# Patient Record
Sex: FEMALE | Race: BLACK OR AFRICAN AMERICAN | NOT HISPANIC OR LATINO | Employment: FULL TIME | URBAN - METROPOLITAN AREA
[De-identification: names, ages, dates, MRNs, and addresses within clinical notes are randomized per-mention and may not be internally consistent; named-entity substitution may affect disease eponyms.]

---

## 2024-02-06 ENCOUNTER — TELEPHONE (OUTPATIENT)
Dept: PRIMARY CARE | Facility: CLINIC | Age: 34
End: 2024-02-06
Payer: COMMERCIAL

## 2024-02-07 ENCOUNTER — APPOINTMENT (OUTPATIENT)
Dept: PRIMARY CARE | Facility: CLINIC | Age: 34
End: 2024-02-07
Payer: COMMERCIAL

## 2024-02-14 ENCOUNTER — OFFICE VISIT (OUTPATIENT)
Dept: PRIMARY CARE | Facility: CLINIC | Age: 34
End: 2024-02-14
Payer: COMMERCIAL

## 2024-02-14 VITALS
DIASTOLIC BLOOD PRESSURE: 76 MMHG | HEART RATE: 74 BPM | WEIGHT: 162 LBS | OXYGEN SATURATION: 99 % | BODY MASS INDEX: 28.7 KG/M2 | SYSTOLIC BLOOD PRESSURE: 120 MMHG | TEMPERATURE: 97.4 F

## 2024-02-14 DIAGNOSIS — E55.9 VITAMIN D DEFICIENCY: ICD-10-CM

## 2024-02-14 DIAGNOSIS — Z00.00 PHYSICAL EXAM: Primary | ICD-10-CM

## 2024-02-14 DIAGNOSIS — E06.3 HASHIMOTO'S THYROIDITIS: ICD-10-CM

## 2024-02-14 PROCEDURE — 99395 PREV VISIT EST AGE 18-39: CPT | Performed by: INTERNAL MEDICINE

## 2024-02-14 PROCEDURE — 1036F TOBACCO NON-USER: CPT | Performed by: INTERNAL MEDICINE

## 2024-02-14 RX ORDER — LEVOTHYROXINE SODIUM 25 UG/1
25 TABLET ORAL
COMMUNITY
Start: 2023-07-05 | End: 2024-04-22 | Stop reason: SDUPTHER

## 2024-02-14 RX ORDER — ALBUTEROL SULFATE 90 UG/1
2 AEROSOL, METERED RESPIRATORY (INHALATION) EVERY 4 HOURS PRN
COMMUNITY
Start: 2013-12-30

## 2024-02-14 ASSESSMENT — PATIENT HEALTH QUESTIONNAIRE - PHQ9
SUM OF ALL RESPONSES TO PHQ9 QUESTIONS 1 AND 2: 0
2. FEELING DOWN, DEPRESSED OR HOPELESS: NOT AT ALL
1. LITTLE INTEREST OR PLEASURE IN DOING THINGS: NOT AT ALL

## 2024-02-14 ASSESSMENT — PAIN SCALES - GENERAL: PAINLEVEL: 0-NO PAIN

## 2024-02-14 NOTE — PROGRESS NOTES
"Subjective   Patient ID: Gely Hyman is a 33 y.o. female who presents for Annual Exam.    HPI  1.  Physical exam.  Pap: last done 5/2021      Review of Systems  All pertinent positive symptoms are included in the history of present illness.    All other systems have been reviewed and are negative and noncontributory to this patient's current ailments.    History reviewed. No pertinent past medical history.  History reviewed. No pertinent surgical history.  Social History     Tobacco Use    Smoking status: Never    Smokeless tobacco: Never   Substance Use Topics    Alcohol use: Never    Drug use: Never     No family history on file.  No Known Allergies  Immunization History   Administered Date(s) Administered    Moderna SARS-CoV-2 Vaccination 03/04/2022     Current Outpatient Medications   Medication Instructions    albuterol 90 mcg/actuation inhaler 2 puffs, inhalation, Every 4 hours PRN    levothyroxine (SYNTHROID, LEVOXYL) 25 mcg, oral, Daily before breakfast     Objective   Visit Vitals  /76   Pulse 74   Temp 36.3 °C (97.4 °F)   Wt 73.5 kg (162 lb)   SpO2 99%   BMI 28.70 kg/m²   Smoking Status Never   BSA 1.81 m²     No visits with results within 1 Month(s) from this visit.   Latest known visit with results is:   Legacy Encounter on 03/09/2023   Component Date Value    CONVERTED FINAL DIAGNOSIS 03/09/2023                      Value:  SOFT TISSUE OF RIGHT FOOT, EXCISION:       GANGLION CYST.   VERO GUPTA M.D.  03/13/2023 14:52  [facility] - 03/10/2023      CONVERTED CLINICAL DIAGN* 03/09/2023 Ganglion cyst right foot     CONVERTED GROSS DESCRIPT* 03/09/2023                      Value:Received in formalin labeled with the patient's name and designated,  \"ganglion cyst right foot\" is a 5.5 x 1.5 x 1.5 cm intact and cystic  portion of gray-tan soft tissue with attached fat. The external surfaces  are inked black. Sectioning reveals a cystic cavity containing a clear  and tenacious material with a surrounding " thin gray wall. Representative  sections are submitted in one cassette.      CONVERTED PRE-OPERATIVE * 03/09/2023 Excision of lesion tendon sheath right foot     CONVERTED MICROSCOPIC DE* 03/09/2023 Slides examined; description omitted.      Physical Exam  CONSTITUTIONAL - well nourished, well developed, looks like stated age, in no acute distress, not ill-appearing, and not tired appearing  SKIN - normal skin color and pigmentation, normal skin turgor without rash, lesions, or nodules visualized  HEAD - no trauma, normocephalic  EYES - pupils are equal and reactive to light, extraocular muscles are intact, and normal external exam  ENT - TM's intact, no injection, no signs of infection, uvula midline, normal tongue movement and throat normal, no exudate, nasal passage without discharge and patent  NECK - supple without rigidity, no neck mass was observed, no thyromegaly or thyroid nodules  CHEST - clear to auscultation, no wheezing, no crackles and no rales, good effort  CARDIAC - regular rate and regular rhythm, no skipped beats, no murmur  ABDOMEN - no organomegaly, soft, nontender, nondistended, normal bowel sounds, no guarding/rebound/rigidity, negative McBurney sign and negative Stroud sign  EXTREMITIES - no edema, no deformities  NEUROLOGICAL - normal gait, normal balance, normal motor, no ataxia; alert, oriented and no focal signs  PSYCHIATRIC - alert, pleasant and cordial, age-appropriate  IMMUNOLOGIC - no cervical lymphadenopathy    Assessment/Plan   Problem List Items Addressed This Visit    None  Visit Diagnoses       Physical exam    -  Primary    Relevant Orders    Lipid Panel    Comprehensive Metabolic Panel    CBC    Hashimoto's thyroiditis        Relevant Orders    TSH with reflex to Free T4 if abnormal    Vitamin D deficiency        Relevant Orders    Vitamin D 25-Hydroxy,Total (for eval of Vitamin D levels)         Healthy diet and exercise encouraged. Low fat, low salt diet. Drink plenty of  fluids. Exercise at least 5 times/week for at least 45 minutes per day.

## 2024-04-09 ENCOUNTER — TELEPHONE (OUTPATIENT)
Dept: PRIMARY CARE | Facility: CLINIC | Age: 34
End: 2024-04-09

## 2024-04-09 NOTE — TELEPHONE ENCOUNTER
Pt stated she will be sending over a fax with her labs listed and wanted to know if Dr. Leon thinks she needs more labs done prior to her surgery, please call pt.

## 2024-04-22 ENCOUNTER — OFFICE VISIT (OUTPATIENT)
Dept: PRIMARY CARE | Facility: CLINIC | Age: 34
End: 2024-04-22
Payer: COMMERCIAL

## 2024-04-22 VITALS
BODY MASS INDEX: 28.51 KG/M2 | WEIGHT: 167 LBS | HEIGHT: 64 IN | HEART RATE: 81 BPM | TEMPERATURE: 97.5 F | OXYGEN SATURATION: 96 % | SYSTOLIC BLOOD PRESSURE: 120 MMHG | DIASTOLIC BLOOD PRESSURE: 78 MMHG

## 2024-04-22 DIAGNOSIS — Z01.818 PREOP GENERAL PHYSICAL EXAM: Primary | ICD-10-CM

## 2024-04-22 DIAGNOSIS — E06.3 HASHIMOTO'S THYROIDITIS: ICD-10-CM

## 2024-04-22 PROCEDURE — 1036F TOBACCO NON-USER: CPT | Performed by: INTERNAL MEDICINE

## 2024-04-22 PROCEDURE — 99395 PREV VISIT EST AGE 18-39: CPT | Performed by: INTERNAL MEDICINE

## 2024-04-22 RX ORDER — LEVOTHYROXINE SODIUM 25 UG/1
25 TABLET ORAL
Qty: 90 TABLET | Refills: 3 | Status: SHIPPED | OUTPATIENT
Start: 2024-04-22 | End: 2024-06-07 | Stop reason: SDUPTHER

## 2024-04-22 ASSESSMENT — PATIENT HEALTH QUESTIONNAIRE - PHQ9
SUM OF ALL RESPONSES TO PHQ9 QUESTIONS 1 AND 2: 0
1. LITTLE INTEREST OR PLEASURE IN DOING THINGS: NOT AT ALL
2. FEELING DOWN, DEPRESSED OR HOPELESS: NOT AT ALL

## 2024-04-22 ASSESSMENT — PAIN SCALES - GENERAL: PAINLEVEL: 0-NO PAIN

## 2024-04-22 NOTE — PROGRESS NOTES
"Subjective   Patient ID: Gely Hyman is a 33 y.o. female who presents for surgical clearance.    HPI  1.  Physical exam. Pt needs a pre-op physical for liposuction.  Pap: last done 5/2021      Review of Systems  All pertinent positive symptoms are included in the history of present illness.    All other systems have been reviewed and are negative and noncontributory to this patient's current ailments.    History reviewed. No pertinent past medical history.  History reviewed. No pertinent surgical history.  Social History     Tobacco Use    Smoking status: Never    Smokeless tobacco: Never   Substance Use Topics    Alcohol use: Never    Drug use: Never     No family history on file.  No Known Allergies  Immunization History   Administered Date(s) Administered    Moderna SARS-CoV-2 Vaccination 03/04/2022     Current Outpatient Medications   Medication Instructions    albuterol 90 mcg/actuation inhaler 2 puffs, inhalation, Every 4 hours PRN    levothyroxine (SYNTHROID, LEVOXYL) 25 mcg, oral, Daily before breakfast     Objective   Visit Vitals  /78   Pulse 81   Temp 36.4 °C (97.5 °F)   Ht 1.613 m (5' 3.5\")   Wt 75.8 kg (167 lb)   SpO2 96%   BMI 29.12 kg/m²   Smoking Status Never   BSA 1.84 m²     No visits with results within 1 Month(s) from this visit.   Latest known visit with results is:   Legacy Encounter on 03/09/2023   Component Date Value    CONVERTED FINAL DIAGNOSIS 03/09/2023                      Value:  SOFT TISSUE OF RIGHT FOOT, EXCISION:       GANGLION CYST.   VERO GUPTA M.D.  03/13/2023 14:52  AMM - 03/10/2023      CONVERTED CLINICAL DIAGN* 03/09/2023 Ganglion cyst right foot     CONVERTED GROSS DESCRIPT* 03/09/2023                      Value:Received in formalin labeled with the patient's name and designated,  \"ganglion cyst right foot\" is a 5.5 x 1.5 x 1.5 cm intact and cystic  portion of gray-tan soft tissue with attached fat. The external surfaces  are inked black. Sectioning reveals a " cystic cavity containing a clear  and tenacious material with a surrounding thin gray wall. Representative  sections are submitted in one cassette.      CONVERTED PRE-OPERATIVE * 03/09/2023 Excision of lesion tendon sheath right foot     CONVERTED MICROSCOPIC DE* 03/09/2023 Slides examined; description omitted.      Physical Exam  CONSTITUTIONAL - well nourished, well developed, looks like stated age, in no acute distress, not ill-appearing, and not tired appearing  SKIN - normal skin color and pigmentation, normal skin turgor without rash, lesions, or nodules visualized  HEAD - no trauma, normocephalic  EYES - pupils are equal and reactive to light, extraocular muscles are intact, and normal external exam  ENT - TM's intact, no injection, no signs of infection, uvula midline, normal tongue movement and throat normal, no exudate, nasal passage without discharge and patent  NECK - supple without rigidity, no neck mass was observed, no thyromegaly or thyroid nodules  CHEST - clear to auscultation, no wheezing, no crackles and no rales, good effort  CARDIAC - regular rate and regular rhythm, no skipped beats, no murmur  ABDOMEN - no organomegaly, soft, nontender, nondistended, normal bowel sounds, no guarding/rebound/rigidity, negative McBurney sign and negative Stroud sign  EXTREMITIES - no edema, no deformities  NEUROLOGICAL - normal gait, normal balance, normal motor, no ataxia; alert, oriented and no focal signs  PSYCHIATRIC - alert, pleasant and cordial, age-appropriate  IMMUNOLOGIC - no cervical lymphadenopathy    Assessment/Plan   Problem List Items Addressed This Visit       Hashimoto's thyroiditis    Relevant Medications    levothyroxine (Synthroid, Levoxyl) 25 mcg tablet     Other Visit Diagnoses       Preop general physical exam    -  Primary    Pt cleared for liposuction surgery

## 2024-04-24 ENCOUNTER — APPOINTMENT (OUTPATIENT)
Dept: PRIMARY CARE | Facility: CLINIC | Age: 34
End: 2024-04-24
Payer: COMMERCIAL

## 2024-05-16 ENCOUNTER — TELEPHONE (OUTPATIENT)
Dept: PRIMARY CARE | Facility: CLINIC | Age: 34
End: 2024-05-16
Payer: COMMERCIAL

## 2024-05-17 NOTE — TELEPHONE ENCOUNTER
AFTER HOURS CALL:     Chief complaint: right leg swelling and pain s/p outpatient surgery lipo 360 on 5/2/24      HPI:      33 y.o. female with right leg swelling s/p lipo 5/2/24 now with right leg swelling and leg pain.  She denies fevers/chills,  CP, heart palpitations, SOB, cough, hemoptysis, or difficulty walking.           Assessment & Plan :  Patient advised to go to ED to r/o DVT    Patient expressed that she will go to the ED now.       911/EMS precautions discussed and reviewed with patient.    Patient advised to call PCP's office on next business day to schedule to be seen.

## 2024-06-07 ENCOUNTER — TELEPHONE (OUTPATIENT)
Dept: PRIMARY CARE | Facility: CLINIC | Age: 34
End: 2024-06-07
Payer: COMMERCIAL

## 2024-06-07 DIAGNOSIS — E06.3 HASHIMOTO'S THYROIDITIS: ICD-10-CM

## 2024-06-07 RX ORDER — LEVOTHYROXINE SODIUM 25 UG/1
25 TABLET ORAL
Qty: 90 TABLET | Refills: 0 | Status: SHIPPED | OUTPATIENT
Start: 2024-06-07 | End: 2025-06-07

## 2024-06-07 NOTE — TELEPHONE ENCOUNTER
Call placed to patient, states the when she tried to refill the levothyroxine through the pharmacy, they stated they did not have any refills on file. Needs new rx sent. Also states sometimes she forgets to take this rx and then she develops migraines/blurry vision. States she recently called off work due to this and is asking for a work excuse. Wants to know if she needs office visit for this or not. Please advise.

## 2024-06-07 NOTE — TELEPHONE ENCOUNTER
Pt left voicemail requesting a refill of levothyroxine. Rx was last sent 4/22/24 for 90 days with 3 additional refills. Need to call pt to clarify. Pt also stated on the voicemail she has an additional question and would like a call back to discuss.

## 2024-07-31 ENCOUNTER — OFFICE VISIT (OUTPATIENT)
Dept: PRIMARY CARE | Facility: CLINIC | Age: 34
End: 2024-07-31
Payer: COMMERCIAL

## 2024-07-31 VITALS
SYSTOLIC BLOOD PRESSURE: 128 MMHG | HEART RATE: 90 BPM | WEIGHT: 160 LBS | DIASTOLIC BLOOD PRESSURE: 78 MMHG | TEMPERATURE: 97.4 F | BODY MASS INDEX: 27.9 KG/M2 | OXYGEN SATURATION: 99 %

## 2024-07-31 DIAGNOSIS — E06.3 HASHIMOTO'S THYROIDITIS: Primary | ICD-10-CM

## 2024-07-31 PROCEDURE — 99213 OFFICE O/P EST LOW 20 MIN: CPT | Performed by: INTERNAL MEDICINE

## 2024-07-31 PROCEDURE — 1036F TOBACCO NON-USER: CPT | Performed by: INTERNAL MEDICINE

## 2024-07-31 ASSESSMENT — ENCOUNTER SYMPTOMS
FATIGUE: 0
ACTIVITY CHANGE: 0
UNEXPECTED WEIGHT CHANGE: 0
HEADACHES: 0
PALPITATIONS: 0
DIZZINESS: 0
SHORTNESS OF BREATH: 0
NUMBNESS: 0
LIGHT-HEADEDNESS: 0

## 2024-07-31 ASSESSMENT — PAIN SCALES - GENERAL: PAINLEVEL: 0-NO PAIN

## 2024-07-31 NOTE — PROGRESS NOTES
Subjective   Patient ID: Gely Hyman is a 34 y.o. female who presents for MED REVIEW.    This is a 34 y.o. presenting for follow up of Hashimoto's. Pt states that she was missing doses of her medication and was symptomatic. She missed work on 6/7 due to these symptoms and needs a work excuse for that day. She feels better since taking Levothyroxine consistently.         Review of Systems   Constitutional:  Negative for activity change, fatigue and unexpected weight change.   Respiratory:  Negative for shortness of breath.    Cardiovascular:  Negative for chest pain, palpitations and leg swelling.   Endocrine: Negative for cold intolerance and heat intolerance.   Neurological:  Negative for dizziness, light-headedness, numbness and headaches.   All other systems reviewed and are negative.      Objective   /78   Pulse 90   Temp 36.3 °C (97.4 °F)   Wt 72.6 kg (160 lb)   SpO2 99%   BMI 27.90 kg/m²     Physical Exam  Vitals reviewed.   Constitutional:       General: She is not in acute distress.     Appearance: Normal appearance. She is not ill-appearing.   Neck:      Thyroid: No thyroid mass, thyromegaly or thyroid tenderness.   Cardiovascular:      Rate and Rhythm: Normal rate and regular rhythm.      Heart sounds: Normal heart sounds.   Pulmonary:      Effort: Pulmonary effort is normal.      Breath sounds: Normal breath sounds.   Musculoskeletal:      Cervical back: Normal range of motion and neck supple.   Skin:     General: Skin is warm and dry.   Neurological:      General: No focal deficit present.      Mental Status: She is alert and oriented to person, place, and time.   Psychiatric:         Mood and Affect: Mood normal.         Assessment/Plan   Diagnoses and all orders for this visit:  Hashimoto's thyroiditis  Comments:  Continue Levothyroxine

## 2024-07-31 NOTE — LETTER
July 31, 2024     Patient: Gely Hyman   YOB: 1990   Date of Visit: 7/31/2024       To Whom It May Concern:    Gely Hyman was seen in my clinic on 7/31/2024 at 3:00 pm. Please excuse Gely for her absence from work on this day to make the appointment. Also excuse her absence on 6/7/2024.    If you have any questions or concerns, please don't hesitate to call.         Sincerely,         Clarice Leon MD        CC: No Recipients

## 2024-08-21 ENCOUNTER — OFFICE VISIT (OUTPATIENT)
Dept: PRIMARY CARE | Facility: CLINIC | Age: 34
End: 2024-08-21
Payer: COMMERCIAL

## 2024-08-21 VITALS
WEIGHT: 164 LBS | TEMPERATURE: 97.4 F | SYSTOLIC BLOOD PRESSURE: 120 MMHG | DIASTOLIC BLOOD PRESSURE: 78 MMHG | BODY MASS INDEX: 28.6 KG/M2 | OXYGEN SATURATION: 97 % | HEART RATE: 71 BPM

## 2024-08-21 DIAGNOSIS — J02.9 ACUTE PHARYNGITIS, UNSPECIFIED ETIOLOGY: Primary | ICD-10-CM

## 2024-08-21 LAB
POC BINAX EXPIRATION: NORMAL
POC BINAX NOW COVID SERIAL NUMBER: NORMAL
POC RAPID STREP: NEGATIVE
POC SARS-COV-2 AG BINAX: NORMAL

## 2024-08-21 PROCEDURE — 1036F TOBACCO NON-USER: CPT | Performed by: INTERNAL MEDICINE

## 2024-08-21 PROCEDURE — 87081 CULTURE SCREEN ONLY: CPT | Mod: WESLAB | Performed by: INTERNAL MEDICINE

## 2024-08-21 PROCEDURE — 99213 OFFICE O/P EST LOW 20 MIN: CPT | Performed by: INTERNAL MEDICINE

## 2024-08-21 PROCEDURE — 87811 SARS-COV-2 COVID19 W/OPTIC: CPT | Performed by: INTERNAL MEDICINE

## 2024-08-21 PROCEDURE — 87880 STREP A ASSAY W/OPTIC: CPT | Performed by: INTERNAL MEDICINE

## 2024-08-21 ASSESSMENT — ENCOUNTER SYMPTOMS
SORE THROAT: 1
STRIDOR: 1
DIARRHEA: 1
VOMITING: 0
SWOLLEN GLANDS: 1
HOARSE VOICE: 1
TROUBLE SWALLOWING: 1
SHORTNESS OF BREATH: 0
ABDOMINAL PAIN: 0
HEADACHES: 0
COUGH: 1
NECK PAIN: 0

## 2024-08-21 ASSESSMENT — PAIN SCALES - GENERAL: PAINLEVEL: 4

## 2024-08-21 NOTE — PROGRESS NOTES
Subjective   Patient ID: Gely Hyman is a 34 y.o. female who presents for Sore Throat.    Sore Throat   This is a new problem. The current episode started yesterday. The problem has been gradually worsening. Neither side of throat is experiencing more pain than the other. There has been no fever. The pain is at a severity of 5/10. The pain is mild. Associated symptoms include congestion, coughing, diarrhea, a hoarse voice, stridor, swollen glands and trouble swallowing. Pertinent negatives include no abdominal pain, drooling, ear discharge, ear pain, headaches, plugged ear sensation, neck pain, shortness of breath or vomiting.        Review of Systems   HENT:  Positive for congestion, hoarse voice, sore throat and trouble swallowing. Negative for drooling, ear discharge and ear pain.    Respiratory:  Positive for cough and stridor. Negative for shortness of breath.    Gastrointestinal:  Positive for diarrhea. Negative for abdominal pain and vomiting.   Musculoskeletal:  Negative for neck pain.   Neurological:  Negative for headaches.       Objective   /78   Pulse 71   Temp 36.3 °C (97.4 °F)   Wt 74.4 kg (164 lb)   SpO2 97%   BMI 28.60 kg/m²     Physical Exam  Vitals reviewed.   Constitutional:       General: She is not in acute distress.     Appearance: Normal appearance. She is normal weight. She is ill-appearing. She is not toxic-appearing.   HENT:      Right Ear: Tympanic membrane normal.      Left Ear: Tympanic membrane normal.      Nose: Nose normal.      Mouth/Throat:      Mouth: Mucous membranes are moist.      Pharynx: Oropharynx is clear. No oropharyngeal exudate or posterior oropharyngeal erythema.   Pulmonary:      Effort: Pulmonary effort is normal.      Breath sounds: Normal breath sounds.   Musculoskeletal:      Cervical back: No tenderness.   Lymphadenopathy:      Cervical: No cervical adenopathy.   Skin:     Findings: No rash.   Neurological:      General: No focal deficit present.       Mental Status: She is alert and oriented to person, place, and time.   Psychiatric:         Mood and Affect: Mood normal.         Assessment/Plan   Diagnoses and all orders for this visit:  Acute pharyngitis, unspecified etiology  Comments:  Symptomatic care. Repeat home Covid test in 1-2 days  Orders:  -     POCT Rapid Strep A manually resulted  -     POCT BinaxNOW Covid-19 Ag Card manually resulted  -     Group A Streptococcus, Culture

## 2024-08-21 NOTE — LETTER
August 21, 2024     Patient: Gely Hyman   YOB: 1990   Date of Visit: 8/21/2024       To Whom It May Concern:    Gely Hyman was seen in my clinic on 8/21/2024 at 8:00 am. Please excuse Gely for her absence from work on this day to make the appointment.    If you have any questions or concerns, please don't hesitate to call.         Sincerely,         Clarice Leon MD        CC: No Recipients

## 2024-08-23 ENCOUNTER — TELEPHONE (OUTPATIENT)
Dept: PRIMARY CARE | Facility: CLINIC | Age: 34
End: 2024-08-23
Payer: COMMERCIAL

## 2024-08-26 DIAGNOSIS — J02.9 ACUTE PHARYNGITIS, UNSPECIFIED ETIOLOGY: ICD-10-CM

## 2024-08-27 ENCOUNTER — CLINICAL SUPPORT (OUTPATIENT)
Dept: PRIMARY CARE | Facility: CLINIC | Age: 34
End: 2024-08-27
Payer: COMMERCIAL

## 2024-08-27 DIAGNOSIS — J02.9 ACUTE PHARYNGITIS, UNSPECIFIED ETIOLOGY: ICD-10-CM

## 2024-08-27 PROCEDURE — 87081 CULTURE SCREEN ONLY: CPT | Mod: WESLAB

## 2024-08-29 LAB — S PYO THROAT QL CULT: NORMAL

## 2025-01-27 ENCOUNTER — OFFICE VISIT (OUTPATIENT)
Dept: PRIMARY CARE | Facility: CLINIC | Age: 35
End: 2025-01-27
Payer: COMMERCIAL

## 2025-01-27 VITALS
WEIGHT: 180 LBS | HEART RATE: 88 BPM | SYSTOLIC BLOOD PRESSURE: 112 MMHG | DIASTOLIC BLOOD PRESSURE: 78 MMHG | BODY MASS INDEX: 30.73 KG/M2 | OXYGEN SATURATION: 98 % | HEIGHT: 64 IN | TEMPERATURE: 97 F

## 2025-01-27 DIAGNOSIS — F33.8 SEASONAL AFFECTIVE DISORDER (CMS-HCC): Primary | ICD-10-CM

## 2025-01-27 PROCEDURE — 99213 OFFICE O/P EST LOW 20 MIN: CPT | Performed by: INTERNAL MEDICINE

## 2025-01-27 PROCEDURE — 1036F TOBACCO NON-USER: CPT | Performed by: INTERNAL MEDICINE

## 2025-01-27 PROCEDURE — 3008F BODY MASS INDEX DOCD: CPT | Performed by: INTERNAL MEDICINE

## 2025-01-27 RX ORDER — BUPROPION HYDROCHLORIDE 150 MG/1
150 TABLET ORAL EVERY MORNING
Qty: 30 TABLET | Refills: 0 | Status: SHIPPED | OUTPATIENT
Start: 2025-01-27 | End: 2025-01-31 | Stop reason: SINTOL

## 2025-01-27 ASSESSMENT — ENCOUNTER SYMPTOMS
DIARRHEA: 0
ABDOMINAL PAIN: 0
NAUSEA: 0
CHILLS: 0
SHORTNESS OF BREATH: 0
FEVER: 0
HEADACHES: 0
PALPITATIONS: 0
DIZZINESS: 0
DYSPHORIC MOOD: 1
VOMITING: 0
NERVOUS/ANXIOUS: 0
FATIGUE: 0

## 2025-01-27 ASSESSMENT — PATIENT HEALTH QUESTIONNAIRE - PHQ9
10. IF YOU CHECKED OFF ANY PROBLEMS, HOW DIFFICULT HAVE THESE PROBLEMS MADE IT FOR YOU TO DO YOUR WORK, TAKE CARE OF THINGS AT HOME, OR GET ALONG WITH OTHER PEOPLE: SOMEWHAT DIFFICULT
1. LITTLE INTEREST OR PLEASURE IN DOING THINGS: SEVERAL DAYS
2. FEELING DOWN, DEPRESSED OR HOPELESS: SEVERAL DAYS
SUM OF ALL RESPONSES TO PHQ9 QUESTIONS 1 AND 2: 2

## 2025-01-27 ASSESSMENT — PAIN SCALES - GENERAL: PAINLEVEL_OUTOF10: 0-NO PAIN

## 2025-01-27 NOTE — PROGRESS NOTES
"Subjective   Patient ID: Gely Hyman is a 34 y.o. female who presents for Depression.    This is a 34 y.o. presenting with depression that she believes is due to season change. Pt states that she notices that her mood changes in the winter. She states that she is easily irritated. She is snappy to people at work. She is being reprimanded at work for her behavior. Pt would like treatment.         Review of Systems   Constitutional:  Negative for chills, fatigue and fever.   Respiratory:  Negative for shortness of breath.    Cardiovascular:  Negative for palpitations.   Gastrointestinal:  Negative for abdominal pain, diarrhea, nausea and vomiting.   Neurological:  Negative for dizziness and headaches.   Psychiatric/Behavioral:  Positive for dysphoric mood. The patient is not nervous/anxious.        Objective   /78   Pulse 88   Temp 36.1 °C (97 °F)   Ht 1.613 m (5' 3.5\")   Wt 81.6 kg (180 lb)   SpO2 98%   BMI 31.39 kg/m²     Physical Exam  Vitals reviewed.   Constitutional:       General: She is not in acute distress.     Appearance: Normal appearance. She is not ill-appearing.   Cardiovascular:      Rate and Rhythm: Normal rate and regular rhythm.      Heart sounds: Normal heart sounds.   Pulmonary:      Effort: Pulmonary effort is normal.      Breath sounds: Normal breath sounds.   Skin:     General: Skin is warm and dry.   Neurological:      General: No focal deficit present.      Mental Status: She is alert and oriented to person, place, and time.   Psychiatric:         Mood and Affect: Mood normal.         Behavior: Behavior normal.         Assessment/Plan   Diagnoses and all orders for this visit:  Seasonal affective disorder (CMS-[facility])  -     buPROPion XL (Wellbutrin XL) 150 mg 24 hr tablet; Take 1 tablet (150 mg) by mouth once daily in the morning. Do not crush, chew, or split.  Other orders  -     Follow Up In Primary Care - Established; Future         "

## 2025-01-31 ENCOUNTER — TELEPHONE (OUTPATIENT)
Dept: PRIMARY CARE | Facility: CLINIC | Age: 35
End: 2025-01-31
Payer: COMMERCIAL

## 2025-01-31 DIAGNOSIS — F33.8 SEASONAL AFFECTIVE DISORDER (CMS-HCC): Primary | ICD-10-CM

## 2025-01-31 RX ORDER — FLUOXETINE HYDROCHLORIDE 20 MG/1
20 CAPSULE ORAL DAILY
Qty: 30 CAPSULE | Refills: 3 | Status: SHIPPED | OUTPATIENT
Start: 2025-01-31 | End: 2025-05-31

## 2025-01-31 NOTE — TELEPHONE ENCOUNTER
Pt was called and notifed and ask if she can letter for work for today because she was nauseas,pt would like emailed back to her if [email or web address]

## 2025-01-31 NOTE — LETTER
January 31, 2025     Patient: Gely Hyman   YOB: 1990           To Whom It May Concern:    Please excuse Gely for her absence from work on this day. Se may return to work on 2/3.    If you have any questions or concerns, please don't hesitate to call.         Sincerely,     Clarice Leon M.D.

## 2025-01-31 NOTE — TELEPHONE ENCOUNTER
Pt lvm stating she just started Wellbutrin but has been very nauseas since she started the med pt asking if there is something she can do. Pt asking for someone to call her in regards to this